# Patient Record
Sex: FEMALE | Race: WHITE
[De-identification: names, ages, dates, MRNs, and addresses within clinical notes are randomized per-mention and may not be internally consistent; named-entity substitution may affect disease eponyms.]

---

## 2017-02-16 ENCOUNTER — HOSPITAL ENCOUNTER (OUTPATIENT)
Dept: HOSPITAL 45 - C.LABBFT | Age: 47
Discharge: HOME | End: 2017-02-16
Attending: INTERNAL MEDICINE
Payer: COMMERCIAL

## 2017-02-16 DIAGNOSIS — D49.7: Primary | ICD-10-CM

## 2017-02-16 LAB — TSH SERPL-ACNC: 2.27 UIU/ML (ref 0.3–4.5)

## 2017-02-23 ENCOUNTER — HOSPITAL ENCOUNTER (OUTPATIENT)
Dept: HOSPITAL 45 - C.LAB1850 | Age: 47
Discharge: HOME | End: 2017-02-23
Attending: INTERNAL MEDICINE
Payer: COMMERCIAL

## 2017-02-23 DIAGNOSIS — D49.7: Primary | ICD-10-CM

## 2017-02-23 DIAGNOSIS — E03.8: ICD-10-CM

## 2017-02-23 LAB — PROLACTIN SERPL-MCNC: 10.88 NG/ML

## 2017-03-09 ENCOUNTER — HOSPITAL ENCOUNTER (OUTPATIENT)
Dept: HOSPITAL 45 - C.LABSPEC | Age: 47
Discharge: HOME | End: 2017-03-09
Attending: OBSTETRICS & GYNECOLOGY
Payer: COMMERCIAL

## 2017-03-09 DIAGNOSIS — B37.3: Primary | ICD-10-CM

## 2017-03-23 ENCOUNTER — HOSPITAL ENCOUNTER (OUTPATIENT)
Dept: HOSPITAL 45 - C.LABSPEC | Age: 47
Discharge: HOME | End: 2017-03-23
Attending: PHYSICIAN ASSISTANT
Payer: COMMERCIAL

## 2017-03-23 DIAGNOSIS — N89.8: Primary | ICD-10-CM

## 2017-04-26 ENCOUNTER — HOSPITAL ENCOUNTER (OUTPATIENT)
Dept: HOSPITAL 45 - C.MAMM | Age: 47
Discharge: HOME | End: 2017-04-26
Attending: OBSTETRICS & GYNECOLOGY
Payer: COMMERCIAL

## 2017-04-26 DIAGNOSIS — N63: Primary | ICD-10-CM

## 2017-04-26 NOTE — MAMMOGRAPHY REPORT
BILATERAL DIGITAL DIAGNOSTIC MAMMOGRAM TOMOSYNTHESIS WITH CAD AND TARGETED BILATERAL ULTRASOUND: 4/2
6/2017

CLINICAL HISTORY: 46 year old woman who presents with palpable masses in both breasts.  She has a hi
story of prior benign biopsy in the lower inner quadrant of the right breast.  





TECHNIQUE: Bilateral breast tomosynthesis in addition to standard 2D mammography was performed. Curr
ent study was also evaluated with a Computer Aided Detection (CAD) system.  



COMPARISON: Comparison is made to exams dated:  6/23/2016 mammogram, 6/22/2015 mammogram, 6/18/2014 
mammogram, 5/31/2013 mammogram, 5/30/2012 mammogram, and 5/10/2011 mammogram - [facility].   



BREAST COMPOSITION:  There are scattered areas of fibroglandular density in both breasts.  



FINDINGS: Triangular skin palpable markers overlie the upper outer anterior, lower inner anterior ri
ght breast, and lower outer anterior left breast.  Overall the breast parenchyma pattern is similar 
to prior mammograms.  There is a stable ribbon shaped metallic biopsy marker in the lower inner ante
rior right breast.  There is a thin rimmed fat density 15 mm mass in the lower inner anterior versus
 6:00 anterior right breast, adjacent to the metallic biopsy marker.  No suspicious spiculated or ir
regular mass, architectural distortion or cluster of microcalcifications is seen.  There are stable 
grouped benign-appearing punctate microcalcifications in the 3:00 anterior subareolar right breast.



Targeted ultrasound was performed in the areas of palpable concern pointed out by the patient (6:00 
right breast, 7:00 right breast and 4:00 left breast).  In the 6:00 right breast, 2 cm from the nipp
le, near the area of concern, there is an oval circumscribed parallel mixed echogenicity echogenic a
nd hypoechoic solid-appearing mass measuring 13.8 x 8.1 x 12.6 mm.  This is thought to correlate wit
h the fat density oil cyst seen mammographically and is most likely benign.  However, when comparing
 back to a previous right breast ultrasound performed in 2011 prior to the ultrasound-guided core bi
opsy, this same mass was smaller in size, measuring 9 x 5 mm.  Although it is probably benign, given
 the slight interval change sonographically, a short interval follow-up targeted ultrasound is recom
mended to ensure stability in 6 months.  In the 7:00 right breast, 4 cm from the nipple, no discrete
 solid or cystic mass is identified.  Also in the 4:00 left breast, 3 cm from the nipple, no discret
e solid or cystic mass is identified.

 



IMPRESSION:  ACR-BI-RADS CATEGORY 3: PROBABLY BENIGN, TARGETED ULTRASOUND ACR-BI-RADS CATEGORY 3: NE
OBABLY BENIGN 

1.  No new suspicious mammographic or sonographic abnormality is seen in the 7:00 right breast are 4
:00 left breast to explain the palpable lumps pointed out by the patient.  Therefore, clinical follo
w-up is recommended, as biopsy of a chronically suspicious mass should not be precluded by negative 
imaging.

2.  In the 6:00 right breast correlating with the third lump pointed out by the patient, there is a 
probable oil cyst mammographically, which is benign.  However sonographically the suspected oil cyst
 appears larger comparing to the 2011 ultrasound.  Although this was likely the previously biopsied 
mass and has a benign mammographic appearance, given the interval change sonographically, a short in
terval follow-up diagnostic mammogram and repeat targeted ultrasound is recommended to ensure stabil
ity in 6 months.



These results and recommendations were discussed with the patient at the time of the exam.  The left
 breast can be reassessed at time of next annual exam in 12 months.



Approximately 10% of breast cancers are not detected with mammography. A negative mammographic repor
t should not delay biopsy if a clinically suggestive mass is present.



Pham Lopez M.D.          

ay/:4/26/2017 09:52:55  



Imaging Technologist: Adri KATHLEEN)(WILLARD), [facility]

letter sent: Follow Up Recommended 3  

BI-RADS Code: ACR-BI-RADS Category 3: Probably Benign  Ultrasound BI-RADS: ACR-BI-RADS Category 3: P
robably Benign

## 2017-05-31 ENCOUNTER — HOSPITAL ENCOUNTER (OUTPATIENT)
Dept: HOSPITAL 45 - C.LAB1850 | Age: 47
Discharge: HOME | End: 2017-05-31
Attending: INTERNAL MEDICINE
Payer: COMMERCIAL

## 2017-05-31 DIAGNOSIS — N91.2: ICD-10-CM

## 2017-05-31 DIAGNOSIS — E03.8: Primary | ICD-10-CM

## 2017-05-31 LAB — TSH SERPL-ACNC: 2.09 UIU/ML (ref 0.3–4.5)

## 2017-06-03 LAB
IGF-I SERPL-MCNC: 153 NG/ML (ref 52–328)
ILGF1 Z SCORE FEMALE: 0.2 SD

## 2017-08-02 ENCOUNTER — HOSPITAL ENCOUNTER (OUTPATIENT)
Dept: HOSPITAL 45 - C.LABBFT | Age: 47
Discharge: HOME | End: 2017-08-02
Attending: FAMILY MEDICINE
Payer: COMMERCIAL

## 2017-08-02 DIAGNOSIS — R73.03: ICD-10-CM

## 2017-08-02 DIAGNOSIS — E78.5: ICD-10-CM

## 2017-08-02 DIAGNOSIS — I10: Primary | ICD-10-CM

## 2017-08-02 LAB
ANION GAP SERPL CALC-SCNC: 3 MMOL/L (ref 3–11)
BUN SERPL-MCNC: 18 MG/DL (ref 7–18)
BUN/CREAT SERPL: 18.7 (ref 10–20)
CALCIUM SERPL-MCNC: 9.7 MG/DL (ref 8.5–10.1)
CHLORIDE SERPL-SCNC: 107 MMOL/L (ref 98–107)
CHOLEST/HDLC SERPL: 3.9 {RATIO}
CO2 SERPL-SCNC: 29 MMOL/L (ref 21–32)
CREAT SERPL-MCNC: 0.95 MG/DL (ref 0.6–1.2)
EST. AVERAGE GLUCOSE BLD GHB EST-MCNC: 111 MG/DL
GLUCOSE SERPL-MCNC: 91 MG/DL (ref 70–99)
GLUCOSE UR QL: 55 MG/DL
KETONES UR QL STRIP: 133 MG/DL
NITRITE UR QL STRIP: 137 MG/DL (ref 0–150)
PH UR: 215 MG/DL (ref 0–200)
POTASSIUM SERPL-SCNC: 4 MMOL/L (ref 3.5–5.1)
SODIUM SERPL-SCNC: 139 MMOL/L (ref 136–145)
TSH SERPL-ACNC: 0.72 UIU/ML (ref 0.3–4.5)
VERY LOW DENSITY LIPOPROT CALC: 27 MG/DL

## 2017-08-14 ENCOUNTER — HOSPITAL ENCOUNTER (OUTPATIENT)
Dept: HOSPITAL 45 - C.PAPS | Age: 47
Discharge: HOME | End: 2017-08-14
Attending: OBSTETRICS & GYNECOLOGY
Payer: COMMERCIAL

## 2017-08-14 DIAGNOSIS — Z12.4: Primary | ICD-10-CM

## 2017-09-28 ENCOUNTER — HOSPITAL ENCOUNTER (OUTPATIENT)
Dept: HOSPITAL 45 - C.LAB1850 | Age: 47
Discharge: HOME | End: 2017-09-28
Attending: INTERNAL MEDICINE
Payer: COMMERCIAL

## 2017-09-28 DIAGNOSIS — Z87.898: Primary | ICD-10-CM

## 2017-09-28 LAB
PROLACTIN SERPL-MCNC: 6.39 NG/ML
TSH SERPL-ACNC: 0.27 UIU/ML (ref 0.3–4.5)

## 2017-10-01 LAB
IGF-I SERPL-MCNC: 128 NG/ML (ref 52–328)
ILGF1 Z SCORE FEMALE: -0.2 SD

## 2017-10-26 ENCOUNTER — HOSPITAL ENCOUNTER (OUTPATIENT)
Dept: HOSPITAL 45 - C.MAMM | Age: 47
Discharge: HOME | End: 2017-10-26
Attending: OBSTETRICS & GYNECOLOGY
Payer: COMMERCIAL

## 2017-10-26 DIAGNOSIS — N63.10: Primary | ICD-10-CM

## 2017-10-26 NOTE — MAMMOGRAPHY REPORT
UNILATERAL RIGHT DIGITAL DIAGNOSTIC MAMMOGRAM TOMOSYNTHESIS WITH CAD AND TARGETED RIGHT ULTRASOUND: 1
0/26/2017

CLINICAL HISTORY: Six-month follow-up of palpable right 6:00 breast mass.  The patient has not notice
d any noticeable change in the size of the lump.  





TECHNIQUE:  Breast tomosynthesis in addition to standard 2D mammography was performed. Current study 
was also evaluated with a Computer Aided Detection (CAD) system.  Right CC and MLO 2-D and tomosynthe
sis images were obtained.



COMPARISON: Comparison is made to exams dated:  4/26/2017 mammogram, 4/26/2017 ultrasound, 6/23/2016 
mammogram, 6/22/2015 mammogram, 6/18/2014 mammogram, and 5/31/2013 mammogram - [facility].   



BREAST COMPOSITION:  There are scattered areas of fibroglandular density in the right breast.  



FINDINGS:  Again noted is an oval circumscribed fat density 14 mm mass within the right lower and sli
ghtly medial right breast.  The mass is without significant change dating back to at least the 2015 e
xam.  Given the fat density, it is consistent with an oil cyst.  An adjacent biopsy marker clip is ag
ain noted within the right lower inner quadrant.  The remainder of the right breast is stable compare
d to prior exams, without suspicious masses, calcifications, or areas of architectural distortion not
ed.  Benign-appearing right breast calcifications are stable.



Targeted ultrasound was performed of the area of the palpable lump previously pointed out by the ronnie
ent, in the right breast at 6:00, 2 cm from the nipple.  At this site there is an oval circumscribed 
parallel mixed echogenicity mass which has hypoechoic, isoechoic, and anechoic cystic components.  Th
e mass is stable in size and appearance compared to the ultrasound dated 4/26/2017, currently measuri
ng 1.4 x 0.7 x 1.3 cm, previously measuring 1.4 x 0.8 x 1.3 m.  This corresponds with the stable fat 
density mass on the mammogram.  Given the fat density mammographically, the mass is benign and compat
ible with an oil cyst.



IMPRESSION:  ACR BI-RADS CATEGORY 2: BENIGN, TARGETED ULTRASOUND ACR BI-RADS CATEGORY 2: BENIGN 

Oval circumscribed fat density 14 mm mass in the right 6:00 breast is stable and is benign and compat
ible with an oil cyst.  There is no mammographic or targeted sonographic evidence of malignancy. Retu
rn to annual mammogram screening schedule is recommended, due April 2018.  The patient has been verba
ashleyy notified of the results.  





Approximately 10% of breast cancers are not detected with mammography. A negative mammographic report
 should not delay biopsy if a clinically suggestive mass is present.



Lynn Miller M.D.          

ah/:10/26/2017 09:22:45  



Imaging Technologist: Aubree SKINNER(SANDRA)(M), [facility]

letter sent: Normal 1/2  

BI-RADS Code: ACR BI-RADS Category 2: Benign  Ultrasound BI-RADS: ACR BI-RADS Category 2: Benign

## 2018-05-21 ENCOUNTER — HOSPITAL ENCOUNTER (OUTPATIENT)
Dept: HOSPITAL 45 - C.LABBFT | Age: 48
Discharge: HOME | End: 2018-05-21
Attending: FAMILY MEDICINE
Payer: COMMERCIAL

## 2018-05-21 DIAGNOSIS — E03.9: ICD-10-CM

## 2018-05-21 DIAGNOSIS — I10: Primary | ICD-10-CM

## 2018-05-21 DIAGNOSIS — E78.5: ICD-10-CM

## 2018-05-21 DIAGNOSIS — R73.03: ICD-10-CM

## 2018-05-21 LAB
BUN SERPL-MCNC: 16 MG/DL (ref 7–18)
CALCIUM SERPL-MCNC: 9.6 MG/DL (ref 8.5–10.1)
CO2 SERPL-SCNC: 29 MMOL/L (ref 21–32)
CREAT SERPL-MCNC: 0.93 MG/DL (ref 0.6–1.2)
GLUCOSE SERPL-MCNC: 91 MG/DL (ref 70–99)
KETONES UR QL STRIP: 157 MG/DL
PH UR: 237 MG/DL (ref 0–200)
POTASSIUM SERPL-SCNC: 4.3 MMOL/L (ref 3.5–5.1)
SODIUM SERPL-SCNC: 138 MMOL/L (ref 136–145)

## 2018-05-22 LAB — HBA1C MFR BLD: 5.5 % (ref 4.5–5.6)
